# Patient Record
Sex: FEMALE | Race: WHITE
[De-identification: names, ages, dates, MRNs, and addresses within clinical notes are randomized per-mention and may not be internally consistent; named-entity substitution may affect disease eponyms.]

---

## 2020-08-13 ENCOUNTER — HOSPITAL ENCOUNTER (OUTPATIENT)
Dept: HOSPITAL 84 - D.NM | Age: 65
Discharge: HOME | End: 2020-08-13
Attending: SURGERY
Payer: COMMERCIAL

## 2020-08-13 ENCOUNTER — HOSPITAL ENCOUNTER (INPATIENT)
Dept: HOSPITAL 84 - D.MS | Age: 65
LOS: 9 days | Discharge: HOME | DRG: 328 | End: 2020-08-22
Attending: SURGERY | Admitting: SURGERY
Payer: MEDICARE

## 2020-08-13 VITALS — HEIGHT: 69 IN | BODY MASS INDEX: 28.88 KG/M2 | WEIGHT: 195 LBS | BODY MASS INDEX: 28.88 KG/M2

## 2020-08-13 DIAGNOSIS — D3A.010: Primary | ICD-10-CM

## 2020-08-13 DIAGNOSIS — J44.9: ICD-10-CM

## 2020-08-13 DIAGNOSIS — R10.9: Primary | ICD-10-CM

## 2020-08-13 DIAGNOSIS — E78.00: ICD-10-CM

## 2020-08-13 DIAGNOSIS — E11.9: ICD-10-CM

## 2020-08-13 NOTE — OP
PATIENT NAME:  EDUARD ROBLES                             MEDICAL RECORD: [id number]
:57                                             LOCATION:D.[id number]
                                                         ADMISSION DATE:20
SURGEON:  BRENNAN CARRENO MD          
 
 
DATE OF OPERATION:  2020
 
PREOPERATIVE DIAGNOSES:
1. Duodenal carcinoid tumor.
2. Diabetes mellitus.
 
POSTOPERATIVE DIAGNOSES:
1. Duodenal carcinoid tumor.
2. Diabetes mellitus.
 
PROCEDURE:  Exploratory laparotomy with transduodenal excision of duodenal
polyp.
 
SURGEON:  Brennan Carreno MD
 
REPORT OF PROCEDURE:  The patient's abdomen was prepped and draped in sterile
fashion.  An upper midline incision was made and using electrocautery, we came
through the subcutaneous tissues and fascia until we entered the abdominal
cavity.  The falciform ligament was found.  We were able to ligate this
proximally and distally with 3-0 silk ties.  We then found the distal aspect of
the stomach and followed it past the pylorus and we were able to eventually
dissect out the patient's duodenum.  We kocherized the duodenum.  We were able
to get underneath it with ease.  I felt around and felt what appeared to be a
thickening on the pancreatic side of the duodenum that correlated with the
carcinoid tumor, which had been seen in the second portion of the duodenum on
endoscopy and also the noted thickening that was found on the CT scan.  I went
ahead and opened up the antimesenteric aspect of the patient's duodenum.  On the
second portion, I was able to look into the lumen.  As I looked around, I was
able to find a small grouping of tissue, which I felt was the ampulla and I
could actually see pancreatic fluid extending through this with manipulation of
the pancreas.  Just posterior to this, there was a firm mass that was present. 
I did not see any extravasation of any material through this with manipulation
of the pancreas.  Went ahead and grasped this grouping of tissue and using
electrocautery, I came through this and I was able to send it off for permanent
specimen.  Permanent specimen came back as benign duodenal tissue.  As I looked
into the bed of this, I could see that I did not penetrate completely through
the wall of the duodenum, but I could see a tubular structure present.  As I
dilated this tubular structure, there was spillage of bilious contents
consistent with the distal aspect of the common bile duct.  I went ahead and
irrigated this portion of the duodenum and then sewed the edges of the mucosa to
the edges of the common bile duct using multiple interrupted 5-0 PDS.  I then
closed the remainder of the duodenal biopsy site using a running 3-0 Vicryl. 
There appear to be good approximation of the tissue.  I then inspected the area
more thoroughly.  I was able to find another clump of tissue on the mid portion
of the second portion of the duodenum.  I used sharp dissection to take a small
piece of this and sent it off and that came back as hyperplastic polyp with no
evidence of carcinoid.  I then extended my kocherization of the duodenum for the
distal third and portion of the duodenum and to the ligament of Treitz.  As I
did this, I was able find a very firm mass of tissue that was on the edge of the
duodenum at the junction of the ligament of Treitz.  As I elevated this up, I
used electrocautery to come around this mass and eventually took off this
section of the tissue, which was full thickness, sent this off for evaluation
 
 
 
OPERATIVE REPORT                               [id number]    EDUARD ROBLES           
 
 
and came back as aberrant benign pancreatic tissue.  I cleaned the wound edges
and performed a 2-layer closure transversely using a running layer of 3-0 Vicryl
and an outer layer of multiple Lemberted 3-0 silks.  This did not stenose the
tissue at all.  I then inspected the duodenum one last time with care taken to
try and make sure I went up towards the pylorus.  I was able to actually
eviscerate the first portion of the duodenum through my wound, kind of like an
intussusception and I was able to visualize the tissues and there was no sign of
a mass or lesion present.  I eventually decided that I would not be able to find
this mass and maybe it was nearly completely removed at the time of its initial
biopsy.  I irrigated out the wound and then closed the transduodenal opening in
2 layers with an inner layer of running 3-0 Vicryl and outer layer of multiple
Lemberted 3-0 silks.  There was some tightening of the second portion of the
duodenum, but there was a mucosal opening present.  I then placed a 19 round
Jairo drain in through the right lower quadrant and rest of this on the lateral
aspect of the second portion of the duodenum and sutured it into place with a
3-0 nylon.  We irrigated out the wound with normal saline.  I inspected the
liver and splenic bed and also inspected the stomach.  I did not see any
evidence of any metastatic disease.  At this point, we closed the fascia using
running #1 loop PDS times 2.  The subcutaneous tissues were reapproximated with
interrupted 3-0 Vicryl and the skin was closed with staples.
 
COMPLICATIONS:  None.
 
CONDITION:  Stable.
 
ANESTHESIA:  General endotracheal.
 
BLOOD LOSS:  100 mL.
 
TRANSINT:[id number] Voice Confirmation ID: [id number] DOCUMENT ID: [id number]
                                           
                                           BRENNAN CARRENO MD          
 
 
 
 
 
 
 
 
 
 
 
 
 
 
 
CC: SOPHIA OLVERA MD, JESSE SHEPHERD MD and FEMI BOWER [id number]
DICTATION DATE: 20 1628     :     20 0049      ADM IN  
                                                                              
[facility]                                          
[address] [id number]

## 2020-08-17 LAB
ANION GAP SERPL CALC-SCNC: 13.7 MMOL/L (ref 8–16)
APTT BLD: 30.1 SECONDS (ref 22.8–39.4)
BASOPHILS NFR BLD AUTO: 0.4 % (ref 0–2)
BUN SERPL-MCNC: 15 MG/DL (ref 7–18)
CALCIUM SERPL-MCNC: 8.3 MG/DL (ref 8.5–10.1)
CHLORIDE SERPL-SCNC: 101 MMOL/L (ref 98–107)
CO2 SERPL-SCNC: 24.6 MMOL/L (ref 21–32)
CREAT SERPL-MCNC: 1.1 MG/DL (ref 0.6–1.3)
EOSINOPHIL NFR BLD: 3.9 % (ref 0–7)
ERYTHROCYTE [DISTWIDTH] IN BLOOD BY AUTOMATED COUNT: 12.5 % (ref 11.5–14.5)
GLUCOSE SERPL-MCNC: 107 MG/DL (ref 74–106)
HCT VFR BLD CALC: 39.5 % (ref 36–48)
HGB BLD-MCNC: 13.1 G/DL (ref 12–16)
IMM GRANULOCYTES NFR BLD: 0.3 % (ref 0–5)
INR PPP: 0.93 (ref 0.85–1.17)
LYMPHOCYTES NFR BLD AUTO: 24.5 % (ref 15–50)
MCH RBC QN AUTO: 29.8 PG (ref 26–34)
MCHC RBC AUTO-ENTMCNC: 33.2 G/DL (ref 31–37)
MCV RBC: 90 FL (ref 80–100)
MONOCYTES NFR BLD: 8.2 % (ref 2–11)
NEUTROPHILS NFR BLD AUTO: 62.7 % (ref 40–80)
OSMOLALITY SERPL CALC.SUM OF ELEC: 270 MOSM/KG (ref 275–300)
PLATELET # BLD: 261 10X3/UL (ref 130–400)
PMV BLD AUTO: 9.3 FL (ref 7.4–10.4)
POTASSIUM SERPL-SCNC: 4.3 MMOL/L (ref 3.5–5.1)
PROTHROMBIN TIME: 12.4 SECONDS (ref 11.6–15)
RBC # BLD AUTO: 4.39 10X6/UL (ref 4–5.4)
SODIUM SERPL-SCNC: 135 MMOL/L (ref 136–145)
WBC # BLD AUTO: 7.5 10X3/UL (ref 4.8–10.8)

## 2020-08-18 VITALS — SYSTOLIC BLOOD PRESSURE: 108 MMHG | DIASTOLIC BLOOD PRESSURE: 49 MMHG

## 2020-08-18 VITALS — DIASTOLIC BLOOD PRESSURE: 54 MMHG | SYSTOLIC BLOOD PRESSURE: 104 MMHG

## 2020-08-18 VITALS — DIASTOLIC BLOOD PRESSURE: 66 MMHG | SYSTOLIC BLOOD PRESSURE: 139 MMHG

## 2020-08-18 VITALS — DIASTOLIC BLOOD PRESSURE: 53 MMHG | SYSTOLIC BLOOD PRESSURE: 109 MMHG

## 2020-08-18 VITALS — SYSTOLIC BLOOD PRESSURE: 97 MMHG | DIASTOLIC BLOOD PRESSURE: 47 MMHG

## 2020-08-18 VITALS — DIASTOLIC BLOOD PRESSURE: 49 MMHG | SYSTOLIC BLOOD PRESSURE: 110 MMHG

## 2020-08-18 VITALS — DIASTOLIC BLOOD PRESSURE: 47 MMHG | SYSTOLIC BLOOD PRESSURE: 118 MMHG

## 2020-08-18 VITALS — DIASTOLIC BLOOD PRESSURE: 47 MMHG | SYSTOLIC BLOOD PRESSURE: 97 MMHG

## 2020-08-18 VITALS — SYSTOLIC BLOOD PRESSURE: 132 MMHG | DIASTOLIC BLOOD PRESSURE: 56 MMHG

## 2020-08-18 VITALS — DIASTOLIC BLOOD PRESSURE: 57 MMHG | SYSTOLIC BLOOD PRESSURE: 130 MMHG

## 2020-08-18 VITALS — SYSTOLIC BLOOD PRESSURE: 115 MMHG | DIASTOLIC BLOOD PRESSURE: 51 MMHG

## 2020-08-18 VITALS — SYSTOLIC BLOOD PRESSURE: 112 MMHG | DIASTOLIC BLOOD PRESSURE: 49 MMHG

## 2020-08-18 PROCEDURE — 0DB90ZZ EXCISION OF DUODENUM, OPEN APPROACH: ICD-10-PCS | Performed by: SURGERY

## 2020-08-18 NOTE — NUR
PT ARRIVES TO ROOM VIA BED ESCORTED BY RECOVERY ROOM STAFF AND FAMILY MEMBER.
PT IS AAO X 4 UPON ARRIVAL AND ANSWERS ALL QUESTIONS APPROPRIATELY. LIMON
CATHETER NOTED AND DRAINING WITHOUT DIFFICULTY. STAT LOCK IN PLACE TO RIGHT
THIGH. ILIA DRAIN NOTED TO RIGHT UPPER/LOWER QUADRANT. BULB IS COMPRESSED.
DRESSING IS CDI. SCANT AMOUNT OF RED DRAINAGE NOTED TO BULB. MIDLINE ABDOMINAL
INCISION IS NOTED AND DRESSING IS CDI. PIV TO LEFT FA IS INFUSING WITHOUT
DIFFICULTY PER ORDER. NG TUBE TO RIGHT NARE IS SET TO LIS. DARK BROWN FLUID
NOTED TO TUBING AND COLLECTION CHAMBER. PT DENIES PRESENCE OF N/V AT THIS
TIME. VSS SEE FLOWSHEET. SCDS ARE ON BLE. BED IS IN THE LOWEST POSITION. CALL
LIGHT AND BEDSIDE TABLE ARE WITHINR EACH. SIDE RAILS X 2. PT AND PT FAMILY
DENY FURTHER NEEDS AT THIS TIME. WILL CONT TO MONITOR.

## 2020-08-18 NOTE — NUR
LYING IN BED. HOB ELEVATED. DAUGHTER AT BEDSIDE. DROWSY. ORIENTED X4.
FORGETFUL. V/S STABLE. NGT NOTED TO RT NARE TO LIS WITH DARK BROWN FLUID IN
CANISTER. LIMON CATH PATENT AND DRAINING CLEAR YELLOW URINE. ILIA DRAIN NOTED TO
RT ABD WITH BLOODY FLUID IN BULB. MIDLINE INCISION DRSG IS C/D/I. ILIA DRAIN
DRSG IS C/D/I. SCDS IN USE BILAT. O2 @ 3L/NC. BED ALARM ON. NS @ 125 ML/HR
INFUSING IN LT FOREARM. DENIES PAIN. FINE RED RASH NOTED TO BILAT FEET. SR
ELEVATED X2. CL IN REACH.

## 2020-08-19 VITALS — DIASTOLIC BLOOD PRESSURE: 61 MMHG | SYSTOLIC BLOOD PRESSURE: 133 MMHG

## 2020-08-19 VITALS — SYSTOLIC BLOOD PRESSURE: 133 MMHG | DIASTOLIC BLOOD PRESSURE: 57 MMHG

## 2020-08-19 VITALS — SYSTOLIC BLOOD PRESSURE: 147 MMHG | DIASTOLIC BLOOD PRESSURE: 66 MMHG

## 2020-08-19 VITALS — DIASTOLIC BLOOD PRESSURE: 66 MMHG | SYSTOLIC BLOOD PRESSURE: 149 MMHG

## 2020-08-19 VITALS — DIASTOLIC BLOOD PRESSURE: 64 MMHG | SYSTOLIC BLOOD PRESSURE: 145 MMHG

## 2020-08-19 VITALS — DIASTOLIC BLOOD PRESSURE: 63 MMHG | SYSTOLIC BLOOD PRESSURE: 119 MMHG

## 2020-08-19 VITALS — SYSTOLIC BLOOD PRESSURE: 153 MMHG | DIASTOLIC BLOOD PRESSURE: 75 MMHG

## 2020-08-19 LAB
ANION GAP SERPL CALC-SCNC: 14 MMOL/L (ref 8–16)
BASOPHILS NFR BLD AUTO: 0 % (ref 0–2)
BUN SERPL-MCNC: 11 MG/DL (ref 7–18)
CALCIUM SERPL-MCNC: 7.1 MG/DL (ref 8.5–10.1)
CHLORIDE SERPL-SCNC: 107 MMOL/L (ref 98–107)
CO2 SERPL-SCNC: 22.2 MMOL/L (ref 21–32)
CREAT SERPL-MCNC: 0.9 MG/DL (ref 0.6–1.3)
EOSINOPHIL NFR BLD: 0 % (ref 0–7)
ERYTHROCYTE [DISTWIDTH] IN BLOOD BY AUTOMATED COUNT: 13.2 % (ref 11.5–14.5)
GLUCOSE SERPL-MCNC: 198 MG/DL (ref 74–106)
HCT VFR BLD CALC: 37.7 % (ref 36–48)
HGB BLD-MCNC: 12.2 G/DL (ref 12–16)
IMM GRANULOCYTES NFR BLD: 0.2 % (ref 0–5)
LYMPHOCYTES NFR BLD AUTO: 6.1 % (ref 15–50)
MCH RBC QN AUTO: 29.5 PG (ref 26–34)
MCHC RBC AUTO-ENTMCNC: 32.4 G/DL (ref 31–37)
MCV RBC: 91.1 FL (ref 80–100)
MONOCYTES NFR BLD: 6.6 % (ref 2–11)
NEUTROPHILS NFR BLD AUTO: 87.1 % (ref 40–80)
OSMOLALITY SERPL CALC.SUM OF ELEC: 282 MOSM/KG (ref 275–300)
PLATELET # BLD: 237 10X3/UL (ref 130–400)
PMV BLD AUTO: 9.7 FL (ref 7.4–10.4)
POTASSIUM SERPL-SCNC: 4.2 MMOL/L (ref 3.5–5.1)
RBC # BLD AUTO: 4.14 10X6/UL (ref 4–5.4)
SODIUM SERPL-SCNC: 139 MMOL/L (ref 136–145)
WBC # BLD AUTO: 12.4 10X3/UL (ref 4.8–10.8)

## 2020-08-19 NOTE — NUR
HAS RESTED WELL SO FAR THIS SHIFT. DROWSY BUT EASILY AWAKENS. V/S STABLE. NO
DISTRESS. DENIES PAIN. RESP SHALLOW, NONLABORED. SR ELEVATED X2. CL IN REACH.

## 2020-08-19 NOTE — NUR
REC'D IN WALKING ROUNDS AWAKE AND ALERT. RESP EVEN AND UNLABORED WITH NO
DISTRESS NOTED. HAS NG TUBE NOTED TO RIGHT NARE TO LIWS. CAN EXPRESS NEEDS AND
WANTS. NO C/O NOTED OR VOICED AT THIS TIME. ASSESMENT COMPLETED. C/L IN REACH
AT BEDSIDE.

## 2020-08-20 VITALS — DIASTOLIC BLOOD PRESSURE: 87 MMHG | SYSTOLIC BLOOD PRESSURE: 174 MMHG

## 2020-08-20 VITALS — DIASTOLIC BLOOD PRESSURE: 94 MMHG | SYSTOLIC BLOOD PRESSURE: 183 MMHG

## 2020-08-20 VITALS — DIASTOLIC BLOOD PRESSURE: 63 MMHG | SYSTOLIC BLOOD PRESSURE: 151 MMHG

## 2020-08-20 VITALS — SYSTOLIC BLOOD PRESSURE: 163 MMHG | DIASTOLIC BLOOD PRESSURE: 70 MMHG

## 2020-08-20 VITALS — SYSTOLIC BLOOD PRESSURE: 159 MMHG | DIASTOLIC BLOOD PRESSURE: 71 MMHG

## 2020-08-20 LAB
ANION GAP SERPL CALC-SCNC: 11.2 MMOL/L (ref 8–16)
BASOPHILS NFR BLD AUTO: 0.2 % (ref 0–2)
BUN SERPL-MCNC: 9 MG/DL (ref 7–18)
CALCIUM SERPL-MCNC: 7 MG/DL (ref 8.5–10.1)
CHLORIDE SERPL-SCNC: 106 MMOL/L (ref 98–107)
CO2 SERPL-SCNC: 22.2 MMOL/L (ref 21–32)
CREAT SERPL-MCNC: 0.8 MG/DL (ref 0.6–1.3)
EOSINOPHIL NFR BLD: 0.3 % (ref 0–7)
ERYTHROCYTE [DISTWIDTH] IN BLOOD BY AUTOMATED COUNT: 13.3 % (ref 11.5–14.5)
GLUCOSE SERPL-MCNC: 132 MG/DL (ref 74–106)
HCT VFR BLD CALC: 34.4 % (ref 36–48)
HGB BLD-MCNC: 11.1 G/DL (ref 12–16)
IMM GRANULOCYTES NFR BLD: 0.2 % (ref 0–5)
LYMPHOCYTES NFR BLD AUTO: 10.2 % (ref 15–50)
MCH RBC QN AUTO: 29.4 PG (ref 26–34)
MCHC RBC AUTO-ENTMCNC: 32.3 G/DL (ref 31–37)
MCV RBC: 91.2 FL (ref 80–100)
MONOCYTES NFR BLD: 6.4 % (ref 2–11)
NEUTROPHILS NFR BLD AUTO: 82.7 % (ref 40–80)
OSMOLALITY SERPL CALC.SUM OF ELEC: 272 MOSM/KG (ref 275–300)
PLATELET # BLD: 242 10X3/UL (ref 130–400)
PMV BLD AUTO: 9.8 FL (ref 7.4–10.4)
POTASSIUM SERPL-SCNC: 3.4 MMOL/L (ref 3.5–5.1)
RBC # BLD AUTO: 3.77 10X6/UL (ref 4–5.4)
SODIUM SERPL-SCNC: 136 MMOL/L (ref 136–145)
WBC # BLD AUTO: 12.6 10X3/UL (ref 4.8–10.8)

## 2020-08-20 NOTE — NUR
PT CALLED ME TO ROOM TO RETAPE NG TUBE. UPON EXAM OF NG TUBE I NOTICED THE NG
HAS COME OUT SOME. I AUSCULTATED FOR PLACEMENT NO SOUNDS NOTED. ADVANCED NG
TUBE TO THE NU ON TUBING. POSITIVE SOUND WHEN AUSCULTATED AGAIN. PUT IN
ORDER FOR KUB TO CONFIRM PLACEMENT. WILL CONTINUE TO MONTIOR.

## 2020-08-21 VITALS — DIASTOLIC BLOOD PRESSURE: 82 MMHG | SYSTOLIC BLOOD PRESSURE: 176 MMHG

## 2020-08-21 VITALS — SYSTOLIC BLOOD PRESSURE: 152 MMHG | DIASTOLIC BLOOD PRESSURE: 81 MMHG

## 2020-08-21 VITALS — DIASTOLIC BLOOD PRESSURE: 89 MMHG | SYSTOLIC BLOOD PRESSURE: 174 MMHG

## 2020-08-21 VITALS — SYSTOLIC BLOOD PRESSURE: 180 MMHG | DIASTOLIC BLOOD PRESSURE: 92 MMHG

## 2020-08-21 VITALS — SYSTOLIC BLOOD PRESSURE: 166 MMHG | DIASTOLIC BLOOD PRESSURE: 87 MMHG

## 2020-08-21 VITALS — DIASTOLIC BLOOD PRESSURE: 80 MMHG | SYSTOLIC BLOOD PRESSURE: 160 MMHG

## 2020-08-21 LAB
ANION GAP SERPL CALC-SCNC: 12.7 MMOL/L (ref 8–16)
BASOPHILS NFR BLD AUTO: 0.3 % (ref 0–2)
BUN SERPL-MCNC: 11 MG/DL (ref 7–18)
CALCIUM SERPL-MCNC: 8.1 MG/DL (ref 8.5–10.1)
CHLORIDE SERPL-SCNC: 102 MMOL/L (ref 98–107)
CO2 SERPL-SCNC: 23.6 MMOL/L (ref 21–32)
CREAT SERPL-MCNC: 0.7 MG/DL (ref 0.6–1.3)
EOSINOPHIL NFR BLD: 1.8 % (ref 0–7)
ERYTHROCYTE [DISTWIDTH] IN BLOOD BY AUTOMATED COUNT: 12.8 % (ref 11.5–14.5)
GLUCOSE SERPL-MCNC: 134 MG/DL (ref 74–106)
HCT VFR BLD CALC: 36.9 % (ref 36–48)
HGB BLD-MCNC: 12.5 G/DL (ref 12–16)
IMM GRANULOCYTES NFR BLD: 0.3 % (ref 0–5)
LYMPHOCYTES NFR BLD AUTO: 15.7 % (ref 15–50)
MCH RBC QN AUTO: 29.7 PG (ref 26–34)
MCHC RBC AUTO-ENTMCNC: 33.9 G/DL (ref 31–37)
MCV RBC: 87.6 FL (ref 80–100)
MONOCYTES NFR BLD: 7.4 % (ref 2–11)
NEUTROPHILS NFR BLD AUTO: 74.5 % (ref 40–80)
OSMOLALITY SERPL CALC.SUM OF ELEC: 270 MOSM/KG (ref 275–300)
PLATELET # BLD: 272 10X3/UL (ref 130–400)
PMV BLD AUTO: 9.4 FL (ref 7.4–10.4)
POTASSIUM SERPL-SCNC: 3.3 MMOL/L (ref 3.5–5.1)
RBC # BLD AUTO: 4.21 10X6/UL (ref 4–5.4)
SODIUM SERPL-SCNC: 135 MMOL/L (ref 136–145)
WBC # BLD AUTO: 11.2 10X3/UL (ref 4.8–10.8)

## 2020-08-21 NOTE — NUR
Nutrition follow-up:
Visited with pt during breakfast meal rounds.  Pt ready for NGT to out.
NPO
Reviewed chart; pt now with NGT out and Clear liquids diet started
Labs reviewed
WT: 195#
RDN following.

## 2020-08-22 VITALS — SYSTOLIC BLOOD PRESSURE: 162 MMHG | DIASTOLIC BLOOD PRESSURE: 75 MMHG

## 2020-08-22 VITALS — DIASTOLIC BLOOD PRESSURE: 78 MMHG | SYSTOLIC BLOOD PRESSURE: 160 MMHG

## 2020-08-22 VITALS — DIASTOLIC BLOOD PRESSURE: 79 MMHG | SYSTOLIC BLOOD PRESSURE: 141 MMHG

## 2020-08-22 LAB
ANION GAP SERPL CALC-SCNC: 13.5 MMOL/L (ref 8–16)
BASOPHILS NFR BLD AUTO: 0.2 % (ref 0–2)
BUN SERPL-MCNC: 15 MG/DL (ref 7–18)
CALCIUM SERPL-MCNC: 7.9 MG/DL (ref 8.5–10.1)
CHLORIDE SERPL-SCNC: 100 MMOL/L (ref 98–107)
CO2 SERPL-SCNC: 22.9 MMOL/L (ref 21–32)
CREAT SERPL-MCNC: 0.8 MG/DL (ref 0.6–1.3)
EOSINOPHIL NFR BLD: 4.9 % (ref 0–7)
ERYTHROCYTE [DISTWIDTH] IN BLOOD BY AUTOMATED COUNT: 12.8 % (ref 11.5–14.5)
GLUCOSE SERPL-MCNC: 144 MG/DL (ref 74–106)
HCT VFR BLD CALC: 37.3 % (ref 36–48)
HGB BLD-MCNC: 12.3 G/DL (ref 12–16)
IMM GRANULOCYTES NFR BLD: 0.2 % (ref 0–5)
LYMPHOCYTES NFR BLD AUTO: 16.7 % (ref 15–50)
MCH RBC QN AUTO: 29 PG (ref 26–34)
MCHC RBC AUTO-ENTMCNC: 33 G/DL (ref 31–37)
MCV RBC: 88 FL (ref 80–100)
MONOCYTES NFR BLD: 8.1 % (ref 2–11)
NEUTROPHILS NFR BLD AUTO: 69.9 % (ref 40–80)
OSMOLALITY SERPL CALC.SUM OF ELEC: 269 MOSM/KG (ref 275–300)
PLATELET # BLD: 285 10X3/UL (ref 130–400)
PMV BLD AUTO: 9.2 FL (ref 7.4–10.4)
POTASSIUM SERPL-SCNC: 3.4 MMOL/L (ref 3.5–5.1)
RBC # BLD AUTO: 4.24 10X6/UL (ref 4–5.4)
SODIUM SERPL-SCNC: 133 MMOL/L (ref 136–145)
WBC # BLD AUTO: 8.3 10X3/UL (ref 4.8–10.8)

## 2020-08-22 NOTE — NUR
DISCHARGED PATIENT HOME WITH FAMILY VIA WHEELCHAIR. DISCONTINUED IV, CATHETER
TIP INTACT. DEMONSTRATED HOW TO EMPTY AND COMPRESS ILIA DRAIN. PATIENT
DEMONSTRATED HOW TO PERFORM ACCURATELY. WENT OVER DISCHARGE INSTRUCTIONS WITH
PATIENT AND FAMILY, VERBALIZED UNDERSTANDING. DENIES ANYTHING FURTHER.

## 2021-03-11 ENCOUNTER — HOSPITAL ENCOUNTER (OUTPATIENT)
Dept: HOSPITAL 84 - D.OPS | Age: 66
Discharge: HOME | End: 2021-03-11
Attending: SURGERY
Payer: MEDICARE

## 2021-03-11 VITALS
WEIGHT: 195.41 LBS | WEIGHT: 195.41 LBS | BODY MASS INDEX: 28.94 KG/M2 | HEIGHT: 69 IN | BODY MASS INDEX: 28.94 KG/M2 | HEIGHT: 69 IN

## 2021-03-11 VITALS — SYSTOLIC BLOOD PRESSURE: 107 MMHG | DIASTOLIC BLOOD PRESSURE: 66 MMHG

## 2021-03-11 DIAGNOSIS — D3A.010: Primary | ICD-10-CM

## 2021-03-11 DIAGNOSIS — E11.9: ICD-10-CM

## 2021-03-11 DIAGNOSIS — M81.0: ICD-10-CM

## 2021-03-11 LAB
ANION GAP SERPL CALC-SCNC: 12.1 MMOL/L (ref 8–16)
APTT BLD: 24.4 SECONDS (ref 22.8–39.4)
BASOPHILS NFR BLD AUTO: 0.5 % (ref 0–2)
BUN SERPL-MCNC: 13 MG/DL (ref 7–18)
CALCIUM SERPL-MCNC: 9 MG/DL (ref 8.5–10.1)
CHLORIDE SERPL-SCNC: 104 MMOL/L (ref 98–107)
CO2 SERPL-SCNC: 24.2 MMOL/L (ref 21–32)
CREAT SERPL-MCNC: 1 MG/DL (ref 0.6–1.3)
EOSINOPHIL NFR BLD: 5.8 % (ref 0–7)
ERYTHROCYTE [DISTWIDTH] IN BLOOD BY AUTOMATED COUNT: 13.1 % (ref 11.5–14.5)
GLUCOSE SERPL-MCNC: 136 MG/DL (ref 74–106)
HCT VFR BLD CALC: 37.9 % (ref 36–48)
HGB BLD-MCNC: 12.6 G/DL (ref 12–16)
IMM GRANULOCYTES NFR BLD: 0.2 % (ref 0–5)
INR PPP: 1.02 (ref 0.85–1.17)
LYMPHOCYTES # BLD: 1.9 10X3/UL (ref 1.18–3.74)
LYMPHOCYTES NFR BLD AUTO: 32.4 % (ref 15–50)
MCH RBC QN AUTO: 29.4 PG (ref 26–34)
MCHC RBC AUTO-ENTMCNC: 33.2 G/DL (ref 31–37)
MCV RBC: 88.3 FL (ref 80–100)
MONOCYTES NFR BLD: 8.7 % (ref 2–11)
NEUTROPHILS # BLD: 3.07 10X3/UL (ref 1.56–6.13)
NEUTROPHILS NFR BLD AUTO: 52.4 % (ref 40–80)
OSMOLALITY SERPL CALC.SUM OF ELEC: 273 MOSM/KG (ref 275–300)
PLATELET # BLD: 244 10X3/UL (ref 130–400)
PMV BLD AUTO: 9.3 FL (ref 7.4–10.4)
POTASSIUM SERPL-SCNC: 4.3 MMOL/L (ref 3.5–5.1)
PROTHROMBIN TIME: 12.4 SECONDS (ref 11.6–15)
RBC # BLD AUTO: 4.29 10X6/UL (ref 4–5.4)
SODIUM SERPL-SCNC: 136 MMOL/L (ref 136–145)
WBC # BLD AUTO: 5.9 10X3/UL (ref 4.8–10.8)

## 2021-03-11 NOTE — NUR
DISCHARGE INSTRUCTIONS REVIEWED WITH PATIENT AND SPOUSE. COPY
PROVIDED ALONG WITH LIST OF NSAIDS TO AVOID AND HIATAL HERNIA
INFORMATION. BOTH VOICED UNDERSTANDING.

## 2021-03-11 NOTE — OP
PATIENT NAME:  EDUARD ROBLES                             MEDICAL RECORD: [id number]
:57                                             LOCATION:D.OPS          
                                                         ADMISSION DATE:        
SURGEON:  ARIANA CARRENO MD          
 
 
DATE OF OPERATION:  2021
 
PREOPERATIVE DIAGNOSES:
1.  Duodenal carcinoid.
2.  Diabetes mellitus.
3.  Osteoporosis.
 
POSTOPERATIVE DIAGNOSES:
1.  Duodenal carcinoid.
2.  Diabetes mellitus.
3.  Osteoporosis.
 
PROCEDURE:  EGD with biopsy.
 
SURGEON:  Ariana Carreno MD
 
DESCRIPTION OF PROCEDURE:  An Olympus endoscope was advanced through the mouth
and esophagus.  We easily passed into the stomach and out through the pylorus. 
We were able to get to the third portion of the duodenum.  I saw no ulcerations
or strictures.  The patient did have a very small lesion that was on the first
portion of the duodenum right as you pass through the pylorus.  This lesion was
about 0.5 cm in size, was mildly raised and rounded.  A biopsy was taken of this
mass and it nearly obliterated the mass with the biopsy.  We then pulled back
the scope and inspected the antrum of the stomach.  There are no masses, lesions
or ulcerations in the antrum of the stomach near the pylorus.  A random biopsy
was taken of the antrum of the stomach.  Retroflex view showed that the patient
had a moderate-sized hiatal hernia.  The body and fundus of the stomach had some
enlarged lesions present, most consistent with hyperplastic nodules.  As we
pulled back the scope, we could see that the GE junction rested at 35 cm at the
teeth.  The GE junction had no evidence of any ulcerations or inflammation.  We
then removed the insufflation and slowly pulled out the scope.
 
COMPLICATIONS:  None.
 
CONDITION:  Stable.
 
ANESTHESIA:  TIVA.
 
BLOOD LOSS:  Minimal.
 
TRANSINT:[id number] Voice Confirmation ID: [id number] DOCUMENT ID: [id number]
                                           
                                           ARIANA CARRENO MD          
 
CC: SOPHIA OLVERA MD                                        [id number]
DICTATION DATE: 21     :     21      [facility] 
                                                                      [address] [place] [address]